# Patient Record
Sex: MALE | Race: OTHER
[De-identification: names, ages, dates, MRNs, and addresses within clinical notes are randomized per-mention and may not be internally consistent; named-entity substitution may affect disease eponyms.]

---

## 2023-05-17 PROBLEM — Z00.00 ENCOUNTER FOR PREVENTIVE HEALTH EXAMINATION: Status: ACTIVE | Noted: 2023-05-17

## 2023-05-18 ENCOUNTER — APPOINTMENT (OUTPATIENT)
Dept: SURGERY | Facility: CLINIC | Age: 58
End: 2023-05-18
Payer: MEDICAID

## 2023-05-18 ENCOUNTER — NON-APPOINTMENT (OUTPATIENT)
Age: 58
End: 2023-05-18

## 2023-05-18 VITALS — BODY MASS INDEX: 23.3 KG/M2 | WEIGHT: 172 LBS | HEIGHT: 72 IN

## 2023-05-18 PROCEDURE — 99204 OFFICE O/P NEW MOD 45 MIN: CPT

## 2023-05-18 NOTE — HISTORY OF PRESENT ILLNESS
[de-identified] : This is a healthy 57 yo man with no medical history presenting with symptoms related to L>R inguinal hernia\par He noted to have swelling and discomfort on left side, less so for right side. No nausea or vomiting \par He started nicotine vaping recently otherwise has not previously been a smoker.\par \par He has an allergy to sulfa - rash\par no surgery\par no heart disease, diabetes, no htn\par no kidney or liver disease\par \par last blood work one year ago\par

## 2023-05-18 NOTE — ASSESSMENT
[FreeTextEntry1] : bilateral inguinal hernias\par we discussed laparoscopic/robotic surgery to repair both\par he is interested in proceeding with a repair in the short term\par he has screened negative on the PST questionairre and has no physical limitations except now from the inguinal hernia discomfort\par \par PST waived ok to proceed

## 2023-05-18 NOTE — PHYSICAL EXAM
[de-identified] : nad [de-identified] : ncat [de-identified] : slow unlabored breathing [de-identified] : jacquer [de-identified] : s, nt, nd, small reducible left and right inguinal hernias

## 2023-05-24 NOTE — H&P ADULT - HISTORY OF PRESENT ILLNESS
This is a healthy 57yo man without past medical or surgical history. He has an uncomfortable progressive left inguinal bulge over the last several months that is consistent with a hernia. He feels like he may have smaller one on the right side. He has no nausea or vomiting. He states that was a never smoker but did recently start vaping.  He takes no meds, and he has a sulfa allergy - rash    He has an allergy to sulfa - rash  no surgery  no heart disease, diabetes, no htn  no kidney or liver disease

## 2023-05-24 NOTE — H&P ADULT - ASSESSMENT
healthy active 58 now limited by discomfort from left inguinal hernia and a small right inguinal hernia    plan for robotic bilateral inguinal hernia repair with mesh healthy active 58 now limited by discomfort from left inguinal hernia and a small right inguinal hernia  plan for robotic bilateral inguinal hernia repair with mesh    5/25/2023  I have reviewed the H&P written within 30 days and find no changes today  ready to proceed with procedure

## 2023-05-25 ENCOUNTER — TRANSCRIPTION ENCOUNTER (OUTPATIENT)
Age: 58
End: 2023-05-25

## 2023-05-25 ENCOUNTER — APPOINTMENT (OUTPATIENT)
Dept: SURGERY | Facility: AMBULATORY SURGERY CENTER | Age: 58
End: 2023-05-25

## 2023-05-25 ENCOUNTER — OUTPATIENT (OUTPATIENT)
Dept: INPATIENT UNIT | Facility: HOSPITAL | Age: 58
LOS: 1 days | Discharge: HOME | End: 2023-05-25
Payer: MEDICAID

## 2023-05-25 VITALS
HEIGHT: 72 IN | WEIGHT: 176.81 LBS | HEART RATE: 72 BPM | OXYGEN SATURATION: 97 % | TEMPERATURE: 98 F | RESPIRATION RATE: 18 BRPM | DIASTOLIC BLOOD PRESSURE: 66 MMHG | SYSTOLIC BLOOD PRESSURE: 135 MMHG

## 2023-05-25 VITALS
RESPIRATION RATE: 17 BRPM | OXYGEN SATURATION: 97 % | HEART RATE: 73 BPM | TEMPERATURE: 98 F | DIASTOLIC BLOOD PRESSURE: 74 MMHG | SYSTOLIC BLOOD PRESSURE: 142 MMHG

## 2023-05-25 DIAGNOSIS — K40.20 BILATERAL INGUINAL HERNIA, WITHOUT OBSTRUCTION OR GANGRENE, NOT SPECIFIED AS RECURRENT: ICD-10-CM

## 2023-05-25 DIAGNOSIS — K41.90 UNILATERAL FEMORAL HERNIA, WITHOUT OBSTRUCTION OR GANGRENE, NOT SPECIFIED AS RECURRENT: ICD-10-CM

## 2023-05-25 DIAGNOSIS — Z88.2 ALLERGY STATUS TO SULFONAMIDES: ICD-10-CM

## 2023-05-25 PROCEDURE — C1781: CPT

## 2023-05-25 PROCEDURE — 49650 LAP ING HERNIA REPAIR INIT: CPT | Mod: 50

## 2023-05-25 PROCEDURE — C9399: CPT

## 2023-05-25 PROCEDURE — S2900: CPT

## 2023-05-25 RX ORDER — SODIUM CHLORIDE 9 MG/ML
1000 INJECTION, SOLUTION INTRAVENOUS
Refills: 0 | Status: DISCONTINUED | OUTPATIENT
Start: 2023-05-25 | End: 2023-05-25

## 2023-05-25 RX ORDER — ACETAMINOPHEN 500 MG
650 TABLET ORAL ONCE
Refills: 0 | Status: COMPLETED | OUTPATIENT
Start: 2023-05-25 | End: 2023-05-25

## 2023-05-25 RX ORDER — OXYCODONE HYDROCHLORIDE 5 MG/1
1 TABLET ORAL
Qty: 5 | Refills: 0
Start: 2023-05-25

## 2023-05-25 RX ORDER — MORPHINE SULFATE 50 MG/1
2 CAPSULE, EXTENDED RELEASE ORAL
Refills: 0 | Status: DISCONTINUED | OUTPATIENT
Start: 2023-05-25 | End: 2023-05-25

## 2023-05-25 RX ORDER — KETOROLAC TROMETHAMINE 30 MG/ML
30 SYRINGE (ML) INJECTION ONCE
Refills: 0 | Status: DISCONTINUED | OUTPATIENT
Start: 2023-05-25 | End: 2023-05-25

## 2023-05-25 RX ORDER — MEPERIDINE HYDROCHLORIDE 50 MG/ML
12.5 INJECTION INTRAMUSCULAR; INTRAVENOUS; SUBCUTANEOUS ONCE
Refills: 0 | Status: DISCONTINUED | OUTPATIENT
Start: 2023-05-25 | End: 2023-05-25

## 2023-05-25 RX ADMIN — Medication 650 MILLIGRAM(S): at 13:16

## 2023-05-25 RX ADMIN — SODIUM CHLORIDE 100 MILLILITER(S): 9 INJECTION, SOLUTION INTRAVENOUS at 12:17

## 2023-05-25 RX ADMIN — Medication 650 MILLIGRAM(S): at 13:10

## 2023-05-25 RX ADMIN — MEPERIDINE HYDROCHLORIDE 12.5 MILLIGRAM(S): 50 INJECTION INTRAMUSCULAR; INTRAVENOUS; SUBCUTANEOUS at 12:23

## 2023-05-25 RX ADMIN — MEPERIDINE HYDROCHLORIDE 12.5 MILLIGRAM(S): 50 INJECTION INTRAMUSCULAR; INTRAVENOUS; SUBCUTANEOUS at 12:37

## 2023-05-25 NOTE — BRIEF OPERATIVE NOTE - NSICDXBRIEFPROCEDURE_GEN_ALL_CORE_FT
PROCEDURES:  Robot-assisted repair of inguinal hernia 25-May-2023 11:55:33  Brandie Cervantes  Robot-assisted laparoscopic repair of femoral hernia using da Leno Xi 25-May-2023 11:57:54  Brandie Cervantes

## 2023-05-25 NOTE — BRIEF OPERATIVE NOTE - OPERATION/FINDINGS
Robotic repair of bilateral inguinal hernias with mesh (large 3DMax Light x 2). Left groin hernia - large direct inguinal and femoral hernias. Right groin hernia - indirect inguinal.

## 2023-05-25 NOTE — ASU DISCHARGE PLAN (ADULT/PEDIATRIC) - CARE PROVIDER_API CALL
Jaime Bosch  Surgery  [address] [place] [address] [address] [place] [address]  Phone: [phone]  Fax: [phone]  Follow Up Time: 2 weeks

## 2023-05-25 NOTE — ASU DISCHARGE PLAN (ADULT/PEDIATRIC) - ASU DC SPECIAL INSTRUCTIONSFT
Diet: Continue your regular diet.  Dressings: You have surgical glue over your incisions. It is waterproof; you may shower and bathe normally. Do not pick off the glue, it will fall off by itself. Use the scrotal support as much as possible.  Pain: Take Tylenol 1000 mg every 6-8 hours as needed for mild to moderate pain. Take Percocet every 6-8 hours as needed for severe, breakthrough pain. Please be aware, the medication can cause drowsiness, so reserve for night time use.   Activity: Avoid heavy lifting (anything over 10 pounds) for at least 6 weeks.   Follow up: Call to schedule a follow up appointment in 2 weeks. Diet: Continue your regular diet.  Dressings: You have surgical glue over your incisions. It is waterproof; you may shower and bathe normally. Do not pick off the glue, it will fall off by itself. Use the scrotal support if it helps you.  Pain: Take Tylenol 1000 mg and/or Motrin 400mg every 6-8 hours as needed for mild to moderate pain. You may take both at the same time. Take Oxycodone 5mg every 6-8 hours as needed for severe, breakthrough pain. Please be aware, the medication can cause drowsiness, so reserve for night time use.   Activity: Avoid heavy lifting (anything over 10 pounds) for at least 6 weeks.   Follow up: Call to schedule a follow up appointment in 2 weeks. Diet: Continue your regular diet.  Dressings: You have surgical glue over your incisions. It is waterproof; you may shower and bathe normally. Do not pick off the glue, it will fall off by itself. Use the scrotal support if it helps you.  Pain: Take Tylenol 1000 mg and/or Motrin 400mg every 6-8 hours as needed for mild to moderate pain. You may take both at the same time. Take Oxycodone 5mg every 6-8 hours as needed for severe, breakthrough pain. Please be aware, the medication can cause drowsiness, so reserve for night time use.   Activity: Avoid heavy lifting (anything over 10 pounds) for at least 6 weeks.   Follow up: Call to schedule a follow up appointment in 2 weeks. If you do not urinate within 12 hours of surgery, call Dr. Bosch's office and go to the nearest urgent care or emergency department to get a urinary catheter placed.

## 2023-06-05 NOTE — ASU DISCHARGE PLAN (ADULT/PEDIATRIC) - FOR NEXT 24 HOURS DO NOT:
Pt d/c with instructions and ambulatory. Pt vss and documented. No redness or swelling at iv site. Pt instructed to f/u with pcp. Rx x2 sent to pharm and pt has no additional questions at this time.       Evelyn Martinez RN  06/04/23 [id number]
Statement Selected

## 2023-06-15 ENCOUNTER — APPOINTMENT (OUTPATIENT)
Dept: SURGERY | Facility: CLINIC | Age: 58
End: 2023-06-15
Payer: MEDICAID

## 2023-06-15 DIAGNOSIS — K40.20 BILATERAL INGUINAL HERNIA, W/OUT OBSTRUCTION OR GANGRENE, NOT SPECIFIED AS RECURRENT: ICD-10-CM

## 2023-06-15 PROCEDURE — 99024 POSTOP FOLLOW-UP VISIT: CPT

## 2023-06-15 RX ORDER — OXYCODONE AND ACETAMINOPHEN 5; 325 MG/1; MG/1
5-325 TABLET ORAL
Qty: 6 | Refills: 0 | Status: DISCONTINUED | COMMUNITY
Start: 2023-06-15 | End: 2023-06-15

## 2023-06-15 RX ORDER — OXYCODONE 5 MG/1
5 TABLET ORAL
Qty: 6 | Refills: 0 | Status: ACTIVE | COMMUNITY
Start: 2023-06-15 | End: 1900-01-01

## 2023-06-15 NOTE — PHYSICAL EXAM
[de-identified] : nad [de-identified] : slow unlabored breathing [de-identified] : s, nt, nd, incisions clean, no recurrence, hematoma, seroma or bruising [de-identified] : normal gait and balance

## 2023-06-15 NOTE — ASSESSMENT
[FreeTextEntry1] : well sp bilateral inguinal hernia repair with mesh\par follow prn persistent soreness\par start stretches at week 3 postop.\par \par

## 2023-06-15 NOTE — HISTORY OF PRESENT ILLNESS
[de-identified] : Had an uneventful bilateral robotic hernia repair with multiple defects - femoral, direct, indirect\par Currently doing well with normal healing sensation\par No recurrence or seroma\par ambulating without difficulty\par \par we discussed stretching exercises and expected course of mild incisional discomfort

## 2025-03-14 ENCOUNTER — APPOINTMENT (OUTPATIENT)
Dept: VASCULAR SURGERY | Facility: CLINIC | Age: 60
End: 2025-03-14

## 2025-05-20 DIAGNOSIS — I83.893 VARICOSE VEINS OF BILATERAL LOWER EXTREMITIES WITH OTHER COMPLICATIONS: ICD-10-CM

## 2025-05-28 ENCOUNTER — APPOINTMENT (OUTPATIENT)
Dept: VASCULAR SURGERY | Facility: CLINIC | Age: 60
End: 2025-05-28

## 2025-07-30 ENCOUNTER — NON-APPOINTMENT (OUTPATIENT)
Age: 60
End: 2025-07-30

## 2025-07-30 ENCOUNTER — APPOINTMENT (OUTPATIENT)
Dept: OTOLARYNGOLOGY | Facility: CLINIC | Age: 60
End: 2025-07-30

## 2025-07-30 VITALS — BODY MASS INDEX: 24.65 KG/M2 | WEIGHT: 182 LBS | HEIGHT: 72 IN

## 2025-07-30 DIAGNOSIS — K21.9 GASTRO-ESOPHAGEAL REFLUX DISEASE W/OUT ESOPHAGITIS: ICD-10-CM

## 2025-07-30 DIAGNOSIS — R13.10 DYSPHAGIA, UNSPECIFIED: ICD-10-CM

## 2025-07-30 DIAGNOSIS — J02.9 ACUTE PHARYNGITIS, UNSPECIFIED: ICD-10-CM

## 2025-07-30 PROCEDURE — 99203 OFFICE O/P NEW LOW 30 MIN: CPT | Mod: 25

## 2025-07-30 PROCEDURE — 31575 DIAGNOSTIC LARYNGOSCOPY: CPT

## 2025-07-30 RX ORDER — OMEPRAZOLE 40 MG/1
40 CAPSULE, DELAYED RELEASE ORAL
Qty: 88 | Refills: 2 | Status: ACTIVE | COMMUNITY
Start: 2025-07-30 | End: 1900-01-01

## 2025-08-08 ENCOUNTER — APPOINTMENT (OUTPATIENT)
Dept: GASTROENTEROLOGY | Facility: CLINIC | Age: 60
End: 2025-08-08

## 2025-08-08 ENCOUNTER — APPOINTMENT (OUTPATIENT)
Dept: VASCULAR SURGERY | Facility: CLINIC | Age: 60
End: 2025-08-08
Payer: MEDICAID

## 2025-08-08 VITALS
HEIGHT: 72 IN | DIASTOLIC BLOOD PRESSURE: 88 MMHG | BODY MASS INDEX: 24.65 KG/M2 | WEIGHT: 182 LBS | SYSTOLIC BLOOD PRESSURE: 131 MMHG

## 2025-08-08 DIAGNOSIS — Z78.9 OTHER SPECIFIED HEALTH STATUS: ICD-10-CM

## 2025-08-08 DIAGNOSIS — I83.893 VARICOSE VEINS OF BILATERAL LOWER EXTREMITIES WITH OTHER COMPLICATIONS: ICD-10-CM

## 2025-08-08 PROCEDURE — 93970 EXTREMITY STUDY: CPT

## 2025-08-08 PROCEDURE — 99203 OFFICE O/P NEW LOW 30 MIN: CPT
